# Patient Record
Sex: FEMALE | Race: BLACK OR AFRICAN AMERICAN | NOT HISPANIC OR LATINO | Employment: PART TIME | URBAN - METROPOLITAN AREA
[De-identification: names, ages, dates, MRNs, and addresses within clinical notes are randomized per-mention and may not be internally consistent; named-entity substitution may affect disease eponyms.]

---

## 2023-04-01 ENCOUNTER — HOSPITAL ENCOUNTER (EMERGENCY)
Facility: CLINIC | Age: 28
Discharge: HOME | End: 2023-04-02
Attending: EMERGENCY MEDICINE | Admitting: EMERGENCY MEDICINE
Payer: COMMERCIAL

## 2023-04-01 ENCOUNTER — APPOINTMENT (OUTPATIENT)
Dept: CT IMAGING | Facility: CLINIC | Age: 28
End: 2023-04-01
Attending: EMERGENCY MEDICINE
Payer: COMMERCIAL

## 2023-04-01 VITALS
RESPIRATION RATE: 16 BRPM | TEMPERATURE: 97.4 F | OXYGEN SATURATION: 100 % | WEIGHT: 115 LBS | SYSTOLIC BLOOD PRESSURE: 127 MMHG | HEART RATE: 75 BPM | DIASTOLIC BLOOD PRESSURE: 78 MMHG

## 2023-04-01 DIAGNOSIS — R10.31 ABDOMINAL PAIN, RIGHT LOWER QUADRANT: ICD-10-CM

## 2023-04-01 LAB
ALBUMIN SERPL BCG-MCNC: 4.4 G/DL (ref 3.5–5.2)
ALBUMIN UR-MCNC: NEGATIVE MG/DL
ALP SERPL-CCNC: 73 U/L (ref 35–104)
ALT SERPL W P-5'-P-CCNC: 9 U/L (ref 10–35)
ANION GAP SERPL CALCULATED.3IONS-SCNC: 10 MMOL/L (ref 7–15)
APPEARANCE UR: ABNORMAL
AST SERPL W P-5'-P-CCNC: 23 U/L (ref 10–35)
BASOPHILS # BLD AUTO: 0.1 10E3/UL (ref 0–0.2)
BASOPHILS NFR BLD AUTO: 1 %
BILIRUB SERPL-MCNC: 0.3 MG/DL
BILIRUB UR QL STRIP: NEGATIVE
BUN SERPL-MCNC: 3.9 MG/DL (ref 6–20)
CALCIUM SERPL-MCNC: 9.2 MG/DL (ref 8.6–10)
CHLORIDE SERPL-SCNC: 102 MMOL/L (ref 98–107)
COLOR UR AUTO: ABNORMAL
CREAT SERPL-MCNC: 0.54 MG/DL (ref 0.51–0.95)
DEPRECATED HCO3 PLAS-SCNC: 27 MMOL/L (ref 22–29)
EOSINOPHIL # BLD AUTO: 0.1 10E3/UL (ref 0–0.7)
EOSINOPHIL NFR BLD AUTO: 1 %
ERYTHROCYTE [DISTWIDTH] IN BLOOD BY AUTOMATED COUNT: 12.9 % (ref 10–15)
GFR SERPL CREATININE-BSD FRML MDRD: >90 ML/MIN/1.73M2
GLUCOSE SERPL-MCNC: 98 MG/DL (ref 70–99)
GLUCOSE UR STRIP-MCNC: NEGATIVE MG/DL
HCG UR QL: NEGATIVE
HCT VFR BLD AUTO: 37.7 % (ref 35–47)
HGB BLD-MCNC: 12.5 G/DL (ref 11.7–15.7)
HGB UR QL STRIP: NEGATIVE
HOLD SPECIMEN: NORMAL
IMM GRANULOCYTES # BLD: 0.1 10E3/UL
IMM GRANULOCYTES NFR BLD: 1 %
KETONES UR STRIP-MCNC: NEGATIVE MG/DL
LEUKOCYTE ESTERASE UR QL STRIP: NEGATIVE
LIPASE SERPL-CCNC: 13 U/L (ref 13–60)
LYMPHOCYTES # BLD AUTO: 2.5 10E3/UL (ref 0.8–5.3)
LYMPHOCYTES NFR BLD AUTO: 43 %
MCH RBC QN AUTO: 28.9 PG (ref 26.5–33)
MCHC RBC AUTO-ENTMCNC: 33.2 G/DL (ref 31.5–36.5)
MCV RBC AUTO: 87 FL (ref 78–100)
MONOCYTES # BLD AUTO: 0.6 10E3/UL (ref 0–1.3)
MONOCYTES NFR BLD AUTO: 10 %
NEUTROPHILS # BLD AUTO: 2.6 10E3/UL (ref 1.6–8.3)
NEUTROPHILS NFR BLD AUTO: 44 %
NITRATE UR QL: NEGATIVE
NRBC # BLD AUTO: 0 10E3/UL
NRBC BLD AUTO-RTO: 0 /100
PH UR STRIP: 6 [PH] (ref 5–7)
PLATELET # BLD AUTO: 296 10E3/UL (ref 150–450)
POTASSIUM SERPL-SCNC: 3.4 MMOL/L (ref 3.4–5.3)
PROT SERPL-MCNC: 7.4 G/DL (ref 6.4–8.3)
RBC # BLD AUTO: 4.32 10E6/UL (ref 3.8–5.2)
RBC URINE: <1 /HPF
SODIUM SERPL-SCNC: 139 MMOL/L (ref 136–145)
SP GR UR STRIP: 1 (ref 1–1.03)
SQUAMOUS EPITHELIAL: 3 /HPF
UROBILINOGEN UR STRIP-MCNC: NORMAL MG/DL
WBC # BLD AUTO: 5.8 10E3/UL (ref 4–11)
WBC URINE: 0 /HPF

## 2023-04-01 PROCEDURE — 85025 COMPLETE CBC W/AUTO DIFF WBC: CPT | Performed by: EMERGENCY MEDICINE

## 2023-04-01 PROCEDURE — 81001 URINALYSIS AUTO W/SCOPE: CPT | Performed by: EMERGENCY MEDICINE

## 2023-04-01 PROCEDURE — 99285 EMERGENCY DEPT VISIT HI MDM: CPT | Mod: 25

## 2023-04-01 PROCEDURE — 80053 COMPREHEN METABOLIC PANEL: CPT | Performed by: EMERGENCY MEDICINE

## 2023-04-01 PROCEDURE — 74177 CT ABD & PELVIS W/CONTRAST: CPT

## 2023-04-01 PROCEDURE — 81025 URINE PREGNANCY TEST: CPT | Performed by: EMERGENCY MEDICINE

## 2023-04-01 PROCEDURE — 96375 TX/PRO/DX INJ NEW DRUG ADDON: CPT

## 2023-04-01 PROCEDURE — 36415 COLL VENOUS BLD VENIPUNCTURE: CPT | Performed by: EMERGENCY MEDICINE

## 2023-04-01 PROCEDURE — 96374 THER/PROPH/DIAG INJ IV PUSH: CPT | Mod: 59

## 2023-04-01 PROCEDURE — 83690 ASSAY OF LIPASE: CPT | Performed by: EMERGENCY MEDICINE

## 2023-04-01 RX ORDER — MORPHINE SULFATE 4 MG/ML
4 INJECTION, SOLUTION INTRAMUSCULAR; INTRAVENOUS ONCE
Status: COMPLETED | OUTPATIENT
Start: 2023-04-01 | End: 2023-04-02

## 2023-04-01 RX ORDER — ONDANSETRON 2 MG/ML
4 INJECTION INTRAMUSCULAR; INTRAVENOUS ONCE
Status: COMPLETED | OUTPATIENT
Start: 2023-04-01 | End: 2023-04-02

## 2023-04-01 RX ORDER — IOPAMIDOL 755 MG/ML
58 INJECTION, SOLUTION INTRAVASCULAR ONCE
Status: COMPLETED | OUTPATIENT
Start: 2023-04-01 | End: 2023-04-02

## 2023-04-01 ASSESSMENT — ACTIVITIES OF DAILY LIVING (ADL): ADLS_ACUITY_SCORE: 33

## 2023-04-02 ENCOUNTER — APPOINTMENT (OUTPATIENT)
Dept: ULTRASOUND IMAGING | Facility: CLINIC | Age: 28
End: 2023-04-02
Attending: EMERGENCY MEDICINE
Payer: COMMERCIAL

## 2023-04-02 PROCEDURE — 250N000011 HC RX IP 250 OP 636: Performed by: EMERGENCY MEDICINE

## 2023-04-02 PROCEDURE — 76856 US EXAM PELVIC COMPLETE: CPT

## 2023-04-02 PROCEDURE — 250N000009 HC RX 250: Performed by: EMERGENCY MEDICINE

## 2023-04-02 RX ADMIN — ONDANSETRON 4 MG: 2 INJECTION INTRAMUSCULAR; INTRAVENOUS at 01:29

## 2023-04-02 RX ADMIN — SODIUM CHLORIDE 60 ML: 9 INJECTION, SOLUTION INTRAVENOUS at 00:09

## 2023-04-02 RX ADMIN — MORPHINE SULFATE 4 MG: 4 INJECTION, SOLUTION INTRAMUSCULAR; INTRAVENOUS at 01:28

## 2023-04-02 RX ADMIN — IOPAMIDOL 58 ML: 755 INJECTION, SOLUTION INTRAVENOUS at 00:09

## 2023-04-02 ASSESSMENT — ACTIVITIES OF DAILY LIVING (ADL)
ADLS_ACUITY_SCORE: 35
ADLS_ACUITY_SCORE: 35

## 2023-04-02 NOTE — ED PROVIDER NOTES
History     Chief Complaint:  abd pain    HPI   Franc Floyd is a 28 year old female, previously healthy, who presents with abdominal pain.  She has had constant 7 days of periumbilical and right upper quadrant pain.  There is no identified exacerbating or alleviating factors.  He has never had similar pain before.  She does have nausea but no vomiting.  There is no urinary symptoms or flank pain.  There is no fever.  Last menstrual period was 2 weeks ago and there is no abnormal vaginal discharge.    Independent Historian:   Parent - They report The above history    Review of External Notes: none     ROS:  Review of Systems    Allergies:  No Known Allergies     Medications:    No current outpatient medications on file.      Past Medical History:    No past medical history on file.    Past Surgical History:    No past surgical history on file.     Family History:    family history is not on file.    Social History:     PCP: No primary care provider on file.     Physical Exam     Patient Vitals for the past 24 hrs:   BP Temp Temp src Pulse Resp SpO2 Weight   04/01/23 2116 127/78 97.4  F (36.3  C) Temporal 75 16 100 % 52.2 kg (115 lb)        Physical Exam  General/Appearance: appears stated age, well-groomed, appears mildly uncomfortable  Eyes: EOMI, no scleral injection, no icterus  ENT: MMM  Neck: supple, nl ROM, no stiffness  Cardiovascular: RRR, nl S1S2, no m/r/g, 2+ pulses in all 4 extremities, cap refill <2sec  Respiratory: CTAB, good air movement throughout, no wheezes/rhonchi/rales, no increased WOB, no retractions  Back: no lesions, no CVA ttp  GI: abd soft, non-distended, RUQ and periumbilical ttp,  no HSM, no rebound, no guarding, nl BS  MSK: SOTO, good tone, no bony abnormality  Skin: warm and well-perfused, no rash, no edema, no ecchymosis, nl turgor  Neuro: GCS 15, alert and oriented, no gross focal neuro deficits  Heme: no petechia, no purpura, no active bleeding        Emergency Department Course    [unfilled]    Imaging:  CT Abdomen Pelvis w Contrast   Final Result   IMPRESSION:    1.  No acute inflammatory changes in the abdomen or pelvis. Formed stool material within normal caliber colon without mechanical obstruction or free gas. Postoperative changes in the right lower quadrant. Tiny hiatal hernia.      2.  Right ovarian corpus luteum. Small amount of dependent pelvic physiologic free fluid.      US Pelvis Cmplt w Transvag & Doppler LmtPel Duplex Limited    (Results Pending)      Report per radiology    Laboratory:  Labs Ordered and Resulted from Time of ED Arrival to Time of ED Departure   UA MACROSCOPIC WITH REFLEX TO MICRO AND CULTURE - Abnormal       Result Value    Color Urine Straw      Appearance Urine Slightly Cloudy (*)     Glucose Urine Negative      Bilirubin Urine Negative      Ketones Urine Negative      Specific Gravity Urine 1.002 (*)     Blood Urine Negative      pH Urine 6.0      Protein Albumin Urine Negative      Urobilinogen Urine Normal      Nitrite Urine Negative      Leukocyte Esterase Urine Negative      RBC Urine <1      WBC Urine 0      Squamous Epithelials Urine 3 (*)    COMPREHENSIVE METABOLIC PANEL - Abnormal    Sodium 139      Potassium 3.4      Chloride 102      Carbon Dioxide (CO2) 27      Anion Gap 10      Urea Nitrogen 3.9 (*)     Creatinine 0.54      Calcium 9.2      Glucose 98      Alkaline Phosphatase 73      AST 23      ALT 9 (*)     Protein Total 7.4      Albumin 4.4      Bilirubin Total 0.3      GFR Estimate >90     HCG QUALITATIVE URINE - Normal    hCG Urine Qualitative Negative     LIPASE - Normal    Lipase 13     CBC WITH PLATELETS AND DIFFERENTIAL    WBC Count 5.8      RBC Count 4.32      Hemoglobin 12.5      Hematocrit 37.7      MCV 87      MCH 28.9      MCHC 33.2      RDW 12.9      Platelet Count 296      % Neutrophils 44      % Lymphocytes 43      % Monocytes 10      % Eosinophils 1      % Basophils 1      % Immature Granulocytes 1      NRBCs per 100 WBC 0       Absolute Neutrophils 2.6      Absolute Lymphocytes 2.5      Absolute Monocytes 0.6      Absolute Eosinophils 0.1      Absolute Basophils 0.1      Absolute Immature Granulocytes 0.1      Absolute NRBCs 0.0          Emergency Department Course & Assessments:             Interventions:  Medications   ondansetron (ZOFRAN) injection 4 mg (has no administration in time range)   morphine (PF) injection 4 mg (has no administration in time range)   iopamidol (ISOVUE-370) solution 58 mL (58 mLs Intravenous $Given 4/2/23 0009)   sodium chloride 0.9 % CT scan flush use (60 mLs Intravenous $Given 4/2/23 0009)        Assessments:  2045 Evaluated pt    Independent Interpretation (X-rays, CTs, rhythm strip):  None    Consultations/Discussion of Management or Tests:  None        Social Determinants of Health affecting care:   None    Disposition:  Care of the patient was transferred to my colleague Dr. Palomares pending Pelvic US.     Impression & Plan      Medical Decision Making:  This patient is a pleasant 28-year-old female who presents with several days of constant right-sided abdominal pain.  Labs are relatively unremarkable and do not show a marked elevated white count and she is afebrile.  CT is negative for any obvious infection such as hepatitis, biliary infection and, appendicitis.  LFTs are unremarkable.  Pregnancy test is negative so I am not concerned about ectopic however I am concerned, with a CT showing a right pelvic cyst with some free fluid, for possible torsion so pelvic ultrasound has been ordered.  Her care is being handed off to Dr. Palomares to follow-up on the ultrasound and ultimate disposition.    Diagnosis:    ICD-10-CM    1. Abdominal pain, right lower quadrant  R10.31             Demetria Howard MD  04/02/23 0114

## 2023-04-02 NOTE — ED TRIAGE NOTES
Triage Assessment     Row Name 04/01/23 2115       Triage Assessment (Adult)    Airway WDL WDL       Respiratory WDL    Respiratory WDL WDL       Skin Circulation/Temperature WDL    Skin Circulation/Temperature WDL WDL       Cardiac WDL    Cardiac WDL WDL       Peripheral/Neurovascular WDL    Peripheral Neurovascular WDL WDL       Cognitive/Neuro/Behavioral WDL    Cognitive/Neuro/Behavioral WDL WDL            Pt. Having abd. Pain today=periumbilical for the last 7 days. No vomiting, denies back pain.

## 2023-04-02 NOTE — DISCHARGE INSTRUCTIONS
Discharge Instructions  Ovarian Cyst    Abdominal (belly) pain can be caused by many things. Your provider today has found that you have a cyst on the ovary. Women in their reproductive years form cysts every month, but only cause pain if they are very large, or if they rupture and release blood or fluid. Fortunately, they rarely require surgery or hospitalization. The pain from a ruptured cyst usually gets gradually better, and should be much better within a few days. If there is a large cyst, it will usually go away within 1-2 months, but needs to be watched to be sure it does go away, since sometimes a large cyst can become a cancer. There can be complications of a cyst, or other problems that cannot be found right away, so it is very important that you follow up as directed.      Generally, every Emergency Department visit should have a follow-up clinic visit with either a primary or a specialty clinic/provider. Please follow-up as instructed by your emergency provider today.    Return to the Emergency Department right away if:  Your pain becomes much worse or constant  You get an oral temperature above 100.4 F or as directed by your provider.  You have frequent vomiting  You faint, or feel very weak.  You have new symptoms or anything that worries you.    What can I do to help myself?  Take any medication prescribed by your provider.  You may use Tylenol  (acetaminophen) or Advil , Motrin  (ibuprofen) for pain. Be sure to read and follow the package directions, and ask your provider if you have questions.  Avoid sex for several days, because it will probably be painful.    If you were given a prescription for medicine here today, be sure to read all of the information (including the package insert) that comes with your prescription.  This will include important information about the medicine, its side effects, and any warnings that you need to know about.  The pharmacist who fills the prescription can provide  more information and answer questions you may have about the medicine.  If you have questions or concerns that the pharmacist cannot address, please call or return to the Emergency Department.     Remember that you can always come back to the Emergency Department if you are not able to see your regular provider in the amount of time listed above, if you get any new symptoms, or if there is anything that worries you.

## 2023-04-02 NOTE — ED PROVIDER NOTES
I assumed care of the patient from Dr. Howard pending an ultrasound.  This was undertaken and is largely unremarkable with no signs of torsion as noted below.  Following this patient was feeling improved however was still concerned about her abdominal pain, I have advised Tylenol and ibuprofen and follow-up with her primary care provider return here if new symptoms develop.  Patient was in agreement this plan of action.    US Pelvis Cmpl wo Transvaginal w Abd/Pel Duplex Lmt   Final Result   IMPRESSION:     1.  Trace free fluid seen in the posterior cul-de-sac, favored to be physiologic in nature, otherwise sonographically unremarkable ultrasound of the uterus and ovaries               CT Abdomen Pelvis w Contrast   Final Result   IMPRESSION:    1.  No acute inflammatory changes in the abdomen or pelvis. Formed stool material within normal caliber colon without mechanical obstruction or free gas. Postoperative changes in the right lower quadrant. Tiny hiatal hernia.      2.  Right ovarian corpus luteum. Small amount of dependent pelvic physiologic free fluid.           Trigger, Juvencio Finney MD  04/02/23 7279

## 2024-02-22 ENCOUNTER — OFFICE VISIT (OUTPATIENT)
Dept: URGENT CARE | Facility: URGENT CARE | Age: 29
End: 2024-02-22

## 2024-02-22 ENCOUNTER — ANCILLARY PROCEDURE (OUTPATIENT)
Dept: GENERAL RADIOLOGY | Facility: CLINIC | Age: 29
End: 2024-02-22
Attending: NURSE PRACTITIONER

## 2024-02-22 VITALS
OXYGEN SATURATION: 100 % | DIASTOLIC BLOOD PRESSURE: 82 MMHG | HEART RATE: 95 BPM | WEIGHT: 111 LBS | SYSTOLIC BLOOD PRESSURE: 121 MMHG | TEMPERATURE: 98.5 F | RESPIRATION RATE: 16 BRPM

## 2024-02-22 DIAGNOSIS — J02.9 SORE THROAT: ICD-10-CM

## 2024-02-22 DIAGNOSIS — R10.13 EPIGASTRIC PAIN: ICD-10-CM

## 2024-02-22 DIAGNOSIS — R10.11 RUQ ABDOMINAL PAIN: ICD-10-CM

## 2024-02-22 DIAGNOSIS — M79.10 MYALGIA: ICD-10-CM

## 2024-02-22 DIAGNOSIS — B34.9 VIRAL ILLNESS: Primary | ICD-10-CM

## 2024-02-22 DIAGNOSIS — R11.0 NAUSEA: ICD-10-CM

## 2024-02-22 DIAGNOSIS — R19.7 DIARRHEA, UNSPECIFIED TYPE: ICD-10-CM

## 2024-02-22 LAB
ALBUMIN SERPL-MCNC: 3.2 G/DL (ref 3.4–5)
ALBUMIN UR-MCNC: NEGATIVE MG/DL
ALP SERPL-CCNC: 61 U/L (ref 40–150)
ALT SERPL W P-5'-P-CCNC: 12 U/L (ref 0–50)
ANION GAP SERPL CALCULATED.3IONS-SCNC: <1 MMOL/L (ref 3–14)
APPEARANCE UR: CLEAR
AST SERPL W P-5'-P-CCNC: 21 U/L (ref 0–45)
BACTERIA #/AREA URNS HPF: ABNORMAL /HPF
BASOPHILS # BLD AUTO: 0 10E3/UL (ref 0–0.2)
BASOPHILS NFR BLD AUTO: 0 %
BILIRUB SERPL-MCNC: 0.7 MG/DL (ref 0.2–1.3)
BILIRUB UR QL STRIP: NEGATIVE
BUN SERPL-MCNC: 8 MG/DL (ref 7–30)
CALCIUM SERPL-MCNC: 9.2 MG/DL (ref 8.5–10.1)
CHLORIDE BLD-SCNC: 108 MMOL/L (ref 94–109)
CO2 SERPL-SCNC: 30 MMOL/L (ref 20–32)
COLOR UR AUTO: YELLOW
CREAT SERPL-MCNC: 0.4 MG/DL (ref 0.52–1.04)
DEPRECATED S PYO AG THROAT QL EIA: NEGATIVE
EGFRCR SERPLBLD CKD-EPI 2021: >90 ML/MIN/1.73M2
EOSINOPHIL # BLD AUTO: 0.1 10E3/UL (ref 0–0.7)
EOSINOPHIL NFR BLD AUTO: 1 %
ERYTHROCYTE [DISTWIDTH] IN BLOOD BY AUTOMATED COUNT: 13.1 % (ref 10–15)
ERYTHROCYTE [SEDIMENTATION RATE] IN BLOOD BY WESTERGREN METHOD: 13 MM/HR (ref 0–20)
FLUAV AG SPEC QL IA: NEGATIVE
FLUBV AG SPEC QL IA: NEGATIVE
GLUCOSE BLD-MCNC: 94 MG/DL (ref 70–99)
GLUCOSE UR STRIP-MCNC: NEGATIVE MG/DL
HCG UR QL: NEGATIVE
HCT VFR BLD AUTO: 37.8 % (ref 35–47)
HGB BLD-MCNC: 12.2 G/DL (ref 11.7–15.7)
HGB UR QL STRIP: ABNORMAL
IMM GRANULOCYTES # BLD: 0 10E3/UL
IMM GRANULOCYTES NFR BLD: 0 %
KETONES UR STRIP-MCNC: NEGATIVE MG/DL
LEUKOCYTE ESTERASE UR QL STRIP: NEGATIVE
LYMPHOCYTES # BLD AUTO: 0.8 10E3/UL (ref 0.8–5.3)
LYMPHOCYTES NFR BLD AUTO: 15 %
MCH RBC QN AUTO: 27.5 PG (ref 26.5–33)
MCHC RBC AUTO-ENTMCNC: 32.3 G/DL (ref 31.5–36.5)
MCV RBC AUTO: 85 FL (ref 78–100)
MONOCYTES # BLD AUTO: 0.3 10E3/UL (ref 0–1.3)
MONOCYTES NFR BLD AUTO: 6 %
NEUTROPHILS # BLD AUTO: 4.3 10E3/UL (ref 1.6–8.3)
NEUTROPHILS NFR BLD AUTO: 78 %
NITRATE UR QL: NEGATIVE
PH UR STRIP: 6 [PH] (ref 5–7)
PLATELET # BLD AUTO: 273 10E3/UL (ref 150–450)
POTASSIUM BLD-SCNC: 4.5 MMOL/L (ref 3.4–5.3)
PROT SERPL-MCNC: 7.3 G/DL (ref 6.8–8.8)
RBC # BLD AUTO: 4.44 10E6/UL (ref 3.8–5.2)
RBC #/AREA URNS AUTO: ABNORMAL /HPF
SODIUM SERPL-SCNC: 137 MMOL/L (ref 135–145)
SP GR UR STRIP: >=1.03 (ref 1–1.03)
SQUAMOUS #/AREA URNS AUTO: ABNORMAL /LPF
UROBILINOGEN UR STRIP-ACNC: 0.2 E.U./DL
WBC # BLD AUTO: 5.5 10E3/UL (ref 4–11)
WBC #/AREA URNS AUTO: ABNORMAL /HPF

## 2024-02-22 PROCEDURE — 87635 SARS-COV-2 COVID-19 AMP PRB: CPT | Performed by: NURSE PRACTITIONER

## 2024-02-22 PROCEDURE — 87651 STREP A DNA AMP PROBE: CPT | Performed by: NURSE PRACTITIONER

## 2024-02-22 PROCEDURE — 81025 URINE PREGNANCY TEST: CPT | Performed by: NURSE PRACTITIONER

## 2024-02-22 PROCEDURE — 74019 RADEX ABDOMEN 2 VIEWS: CPT | Mod: TC | Performed by: RADIOLOGY

## 2024-02-22 PROCEDURE — 87804 INFLUENZA ASSAY W/OPTIC: CPT | Performed by: NURSE PRACTITIONER

## 2024-02-22 PROCEDURE — 81001 URINALYSIS AUTO W/SCOPE: CPT | Performed by: NURSE PRACTITIONER

## 2024-02-22 PROCEDURE — 36415 COLL VENOUS BLD VENIPUNCTURE: CPT | Performed by: NURSE PRACTITIONER

## 2024-02-22 PROCEDURE — 85025 COMPLETE CBC W/AUTO DIFF WBC: CPT | Performed by: NURSE PRACTITIONER

## 2024-02-22 PROCEDURE — 80053 COMPREHEN METABOLIC PANEL: CPT | Performed by: NURSE PRACTITIONER

## 2024-02-22 PROCEDURE — 85652 RBC SED RATE AUTOMATED: CPT | Performed by: NURSE PRACTITIONER

## 2024-02-22 PROCEDURE — 99204 OFFICE O/P NEW MOD 45 MIN: CPT | Performed by: NURSE PRACTITIONER

## 2024-02-22 RX ORDER — ACETAMINOPHEN 500 MG
1000 TABLET ORAL EVERY 6 HOURS PRN
Qty: 60 TABLET | Refills: 0 | Status: SHIPPED | OUTPATIENT
Start: 2024-02-22 | End: 2024-03-03

## 2024-02-22 RX ORDER — ONDANSETRON 4 MG/1
4 TABLET, ORALLY DISINTEGRATING ORAL EVERY 8 HOURS PRN
Qty: 21 TABLET | Refills: 0 | Status: SHIPPED | OUTPATIENT
Start: 2024-02-22 | End: 2024-02-29

## 2024-02-22 RX ORDER — ONDANSETRON 4 MG/1
4 TABLET, ORALLY DISINTEGRATING ORAL ONCE
Status: COMPLETED | OUTPATIENT
Start: 2024-02-22 | End: 2024-02-22

## 2024-02-22 RX ADMIN — ONDANSETRON 4 MG: 4 TABLET, ORALLY DISINTEGRATING ORAL at 19:34

## 2024-02-23 LAB — GROUP A STREP BY PCR: NOT DETECTED

## 2024-02-23 NOTE — PATIENT INSTRUCTIONS
Patient Education     If pain worsens please go to the emergency room immediately for further assessment and treatment.    Acetaminophen 1000mg every 6 hours as needed for pain.    Diet for Vomiting and Diarrhea (Child)  Vomiting and diarrhea are common in children. A child can quickly lose too much fluid and become dehydrated. This is the loss of too much water and minerals from the body. This can be serious and even life-threatening. When this occurs, body fluids must be replaced. This is done by giving small amounts of liquids often.  If your child shows signs of dehydration, the doctor may tell you to use an oral rehydration solution. Oral rehydration solution can replace lost minerals called electrolytes. Oral rehydration solution can be used in addition to breast or bottle feedings. Oral rehydration solution may also reduce vomiting and diarrhea. You can buy oral rehydration solution at grocery stores and drug stores without a prescription.   In cases of severe dehydration or vomiting, a child may need to go to a hospital to have intravenous (IV) fluids.  Giving liquids and food  If using oral rehydration solution:  Follow your doctor s instructions when giving the solution to your child.  Use only prepared, purchased oral rehydration solution made for this purpose. Don't make your own solution. This is very important because the homemade solutions and sports drinks may not contain the amounts or ingredients necessary to stop dehydration.  If vomiting or diarrhea gets better after 2 to 3 hours, you can stop oral rehydration solution. You can then restart other clear liquids.  For solid foods:  Follow the diet your doctor advises.  If desired and tolerated, your child may eat regular food.  If your child is an infant and you are breastfeeding, continue to do so unless your healthcare provider directs you stop. If you are feeding formula to your infant, you may try a special oral rehydration solution in small  amounts frequently for a few hours. When the vomiting improves, you may restart the formula.  If unable to eat regular food, your child can drink clear liquids such as water, or suck on ice cubes. Do not give high-sugar fluids such as juice or soda.  If clear liquids are tolerated, slowly increase the amount. Alternate these fluids with oral rehydration solution as your doctor advises.  Your child can start a regular diet 12 to 24 hours after diarrhea or vomiting has stopped. Continue to give plenty of clear liquids.  You can resume your child's normal diet over time as he or she feels better. Don t force your child to eat, especially if he or she is having stomach pain or cramping. Don t feed your child large amounts at a time, even if he or she is hungry. This can make your child feel worse. You can give your child more food over time if he or she can tolerate it. Foods you can give include cereal, mashed potatoes, applesauce, mashed bananas, crackers, dry toast, rice, oatmeal, bread, noodles, pretzels, soups with rice or noodles, and cooked vegetables. As your child improves, you may try lean meats and yogurt.  If the symptoms come back, go back to a simple diet or clear liquids.  Follow-up care  Follow up with your child s healthcare provider, or as advised. If a stool sample was taken or cultures were done, call the healthcare provider for the results as instructed.  Call 911  Call 911 if your child has any of these symptoms:  Trouble breathing  Confusion  Extreme drowsiness or trouble walking  Loss of consciousness  Rapid heart rate  Stiff neck  Seizure  When to seek medical advice  Call your child s healthcare provider right away if any of these occur:  Abdominal pain that gets worse  Constant lower right abdominal pain  Repeated vomiting after the first 2 hours on liquids  Occasional vomiting for more than 24 hours  More than 8 diarrhea stools within 8 hours  Continued severe diarrhea for more than 24  hours  Blood in vomit or stool  Reduced oral intake  Dark urine or no urine for 4 to 6 hours in infants and young children, or 6 for 8 hours in older children, no tears when crying, sunken eyes, or dry mouth  Fussiness or crying that cannot be soothed  Unusual drowsiness  New rash  Diarrhea lasts more than 1 week on antibiotics  A child 2 years or older has a fever for more than 3 days  A child of any age has repeated fevers above 104 F (40 C)  Diana last reviewed this educational content on 2/1/2018 2000-2022 [employer]. Todos los derechos reservados. Esta información no pretende sustituir la atención médica profesional. Sólo schneider médico puede diagnosticar y tratar un problema de hailey.

## 2024-02-23 NOTE — PROGRESS NOTES
ICD-10-CM    1. Viral illness  B34.9       2. Nausea  R11.0 CBC with platelets and differential     Influenza A & B Antigen - Clinic Collect     Symptomatic COVID-19 Virus (Coronavirus) by PCR     ondansetron (ZOFRAN ODT) ODT tab 4 mg     Comprehensive metabolic panel (BMP + Alb, Alk Phos, ALT, AST, Total. Bili, TP)     Symptomatic COVID-19 Virus (Coronavirus) by PCR Nose     CBC with platelets and differential     ondansetron (ZOFRAN ODT) 4 MG ODT tab      3. Diarrhea, unspecified type  R19.7 CBC with platelets and differential     Influenza A & B Antigen - Clinic Collect     Symptomatic COVID-19 Virus (Coronavirus) by PCR     Comprehensive metabolic panel (BMP + Alb, Alk Phos, ALT, AST, Total. Bili, TP)     Symptomatic COVID-19 Virus (Coronavirus) by PCR Nose     CBC with platelets and differential      4. Myalgia  M79.10 Influenza A & B Antigen - Clinic Collect     Symptomatic COVID-19 Virus (Coronavirus) by PCR     ESR: Erythrocyte sedimentation rate     Symptomatic COVID-19 Virus (Coronavirus) by PCR Nose     ESR: Erythrocyte sedimentation rate     acetaminophen (TYLENOL) 500 MG tablet      5. RUQ abdominal pain  R10.11 XR Abdomen 2 Views     UA Macroscopic with reflex to Microscopic and Culture - Clinic Collect     CBC with platelets and differential     HCG Qual, Urine ([id number])     ESR: Erythrocyte sedimentation rate     Comprehensive metabolic panel (BMP + Alb, Alk Phos, ALT, AST, Total. Bili, TP)     HCG Qual, Urine ([id number])     CBC with platelets and differential     ESR: Erythrocyte sedimentation rate     UA Microscopic with Reflex to Culture      6. Epigastric pain  R10.13 XR Abdomen 2 Views     UA Macroscopic with reflex to Microscopic and Culture - Clinic Collect     CBC with platelets and differential     HCG Qual, Urine ([id number])     Comprehensive metabolic panel (BMP + Alb, Alk Phos, ALT, AST, Total. Bili, TP)     HCG Qual, Urine ([id number])     CBC with platelets and differential     UA  Microscopic with Reflex to Culture      7. Sore throat  J02.9 Streptococcus A Rapid Screen w/Reflex to PCR - Clinic Collect     Symptomatic COVID-19 Virus (Coronavirus) by PCR     Symptomatic COVID-19 Virus (Coronavirus) by PCR Nose     Group A Streptococcus PCR Throat Swab      Advised patient to take ondansetron and work on rehydrating herself.  Went over proper diet for nausea vomiting and diarrhea.  No signs of inflammation and sedimentation rate.  She will take acetaminophen as needed for headache aches and pains.  Believe this to be a viral illness that will pass in time.  If she develops any worsening abdominal pain or new fevers of 101 or over she will go to the emergency room.    Recheck in 10 days if symptoms have not improved, sooner if they worsen.  Decongestant as needed.    Ninite  used for entire visit.    Red flag warning signs and when to go to the emergency room discussed.  Reviewed potential adverse reactions to medications.    X-ray of abdomen shows normal gas pattern, no obstructions or free air.    Labs:  Results for orders placed or performed in visit on 02/22/24 (from the past 24 hour(s))   UA Macroscopic with reflex to Microscopic and Culture - Clinic Collect    Specimen: Urine, Clean Catch   Result Value Ref Range    Color Urine Yellow Colorless, Straw, Light Yellow, Yellow    Appearance Urine Clear Clear    Glucose Urine Negative Negative mg/dL    Bilirubin Urine Negative Negative    Ketones Urine Negative Negative mg/dL    Specific Gravity Urine >=1.030 1.003 - 1.035    Blood Urine Trace (A) Negative    pH Urine 6.0 5.0 - 7.0    Protein Albumin Urine Negative Negative mg/dL    Urobilinogen Urine 0.2 0.2, 1.0 E.U./dL    Nitrite Urine Negative Negative    Leukocyte Esterase Urine Negative Negative   Influenza A & B Antigen - Clinic Collect    Specimen: Nose; Swab   Result Value Ref Range    Influenza A antigen Negative Negative    Influenza B antigen Negative Negative     Narrative    Test results must be correlated with clinical data. If necessary, results should be confirmed by a molecular assay or viral culture.   Streptococcus A Rapid Screen w/Reflex to PCR - Clinic Collect    Specimen: Throat; Swab   Result Value Ref Range    Group A Strep antigen Negative Negative   UA Microscopic with Reflex to Culture   Result Value Ref Range    Bacteria Urine None Seen None Seen /HPF    RBC Urine 2-5 (A) 0-2 /HPF /HPF    WBC Urine None Seen 0-5 /HPF /HPF    Squamous Epithelials Urine Few (A) None Seen /LPF    Narrative    Urine Culture not indicated   HCG Qual, Urine ([id number])   Result Value Ref Range    hCG Urine Qualitative Negative Negative   Comprehensive metabolic panel (BMP + Alb, Alk Phos, ALT, AST, Total. Bili, TP)   Result Value Ref Range    Sodium 137 135 - 145 mmol/L    Potassium 4.5 3.4 - 5.3 mmol/L    Chloride 108 94 - 109 mmol/L    Carbon Dioxide (CO2) 30 20 - 32 mmol/L    Anion Gap <1 (L) 3 - 14 mmol/L    Urea Nitrogen 8 7 - 30 mg/dL    Creatinine 0.40 (L) 0.52 - 1.04 mg/dL    GFR Estimate >90 >60 mL/min/1.73m2    Calcium 9.2 8.5 - 10.1 mg/dL    Glucose 94 70 - 99 mg/dL    Alkaline Phosphatase 61 40 - 150 U/L    AST 21 0 - 45 U/L    ALT 12 0 - 50 U/L    Protein Total 7.3 6.8 - 8.8 g/dL    Albumin 3.2 (L) 3.4 - 5.0 g/dL    Bilirubin Total 0.7 0.2 - 1.3 mg/dL   CBC with platelets and differential    Narrative    The following orders were created for panel order CBC with platelets and differential.  Procedure                               Abnormality         Status                     ---------                               -----------         ------                     CBC with platelets and d...[[id number]]                      Final result                 Please view results for these tests on the individual orders.   ESR: Erythrocyte sedimentation rate   Result Value Ref Range    Erythrocyte Sedimentation Rate 13 0 - 20 mm/hr   CBC with platelets and differential   Result  Value Ref Range    WBC Count 5.5 4.0 - 11.0 10e3/uL    RBC Count 4.44 3.80 - 5.20 10e6/uL    Hemoglobin 12.2 11.7 - 15.7 g/dL    Hematocrit 37.8 35.0 - 47.0 %    MCV 85 78 - 100 fL    MCH 27.5 26.5 - 33.0 pg    MCHC 32.3 31.5 - 36.5 g/dL    RDW 13.1 10.0 - 15.0 %    Platelet Count 273 150 - 450 10e3/uL    % Neutrophils 78 %    % Lymphocytes 15 %    % Monocytes 6 %    % Eosinophils 1 %    % Basophils 0 %    % Immature Granulocytes 0 %    Absolute Neutrophils 4.3 1.6 - 8.3 10e3/uL    Absolute Lymphocytes 0.8 0.8 - 5.3 10e3/uL    Absolute Monocytes 0.3 0.0 - 1.3 10e3/uL    Absolute Eosinophils 0.1 0.0 - 0.7 10e3/uL    Absolute Basophils 0.0 0.0 - 0.2 10e3/uL    Absolute Immature Granulocytes 0.0 <=0.4 10e3/uL       SUBJECTIVE:   Franc Floyd is a 29 year old female presenting with a chief complaint of   Chief Complaint   Patient presents with    Diarrhea     Yesterday, nausea, stomach ache, headache, back pain body aches   .    Review of systems is negative except for as noted in the HPI.    OBJECTIVE  /82   Pulse 95   Temp 98.5  F (36.9  C)   Resp 16   Wt 50.3 kg (111 lb)   SpO2 100%       GENERAL: Alert, mild distress  SKIN: skin is clear, no rash or abnormal pigmentation  HEAD: The head is normocephalic.   EYES: The eyes are normal. The conjunctivae and cornea normal.   NECK: The neck is supple and thyroid is normal, no masses; LYMPH NODES: No adenopathy  HENT: Bilateral tympanic membranes and canals appear normal, mild erythema of pharynx, nasal passages are clear  LUNGS: The lung fields are clear to auscultation, no rales, rhonchi, wheezing or retractions  ABD: Tenderness in the right upper quadrant and epigastric region with palpation, normal bowel sounds  CV: Rhythm is regular. S1 and S2 are normal. No murmurs.  EXTREMITIES: Symmetric extremities no deformities    ALEXIS Currie, CNP  [facility] Provider    The use of Dragon/Power[employer] dictation services may have been used to construct  the content in this note; any grammatical or spelling errors are non-intentional. Please contact the author of this note directly if you are in need of any clarification.

## 2024-02-24 LAB — SARS-COV-2 RNA RESP QL NAA+PROBE: NEGATIVE

## 2024-12-21 ENCOUNTER — HOSPITAL ENCOUNTER (EMERGENCY)
Facility: CLINIC | Age: 29
Discharge: HOME | End: 2024-12-21
Attending: EMERGENCY MEDICINE | Admitting: EMERGENCY MEDICINE

## 2024-12-21 VITALS
RESPIRATION RATE: 16 BRPM | OXYGEN SATURATION: 100 % | SYSTOLIC BLOOD PRESSURE: 120 MMHG | HEART RATE: 86 BPM | TEMPERATURE: 96.8 F | DIASTOLIC BLOOD PRESSURE: 77 MMHG

## 2024-12-21 DIAGNOSIS — N64.4 BREAST PAIN, LEFT: ICD-10-CM

## 2024-12-21 PROCEDURE — 99283 EMERGENCY DEPT VISIT LOW MDM: CPT

## 2024-12-21 ASSESSMENT — ACTIVITIES OF DAILY LIVING (ADL)
ADLS_ACUITY_SCORE: 41

## 2024-12-21 NOTE — LETTER
December 21, 2024      To Whom It May Concern:      Franc Floyd was seen in our Emergency Department today, 12/21/24.  I expect her condition to improve over the next 1 days.  She may return to work when improved.    Sincerely,        Israel Rizo RN

## 2024-12-21 NOTE — ED TRIAGE NOTES
Pt reports pt reports pain in the left breast for last 3 weeks. Pt also reports some pain in right breast.      Denies giving birth recently any redness or discharge from breasts or any chest pain or abd pain     Reports some fevers.      Due to language barrier, an  was present during the history-taking and subsequent discussion (and for part of the physical exam) with this patient.

## 2024-12-22 NOTE — DISCHARGE INSTRUCTIONS
You can use tylenol and ibuprofen for pain.  I do think you will need an outpatient breast ultrasound.  You can call [facility] to schedule an appointment. Their number is [phone].  You can also follow up with your primary care provider to schedule an ultrasound.    Please return if your breast becomes red, you have fevers, or rash.

## 2024-12-22 NOTE — ED PROVIDER NOTES
Emergency Department Note      History of Present Illness     Chief Complaint   Breast Pain      HPI   Franc Floyd is a 29 year old female presenting to the emergency department with a complaint of left breast pain for the past 3 weeks.  Patient is not currently breast-feeding.  She has not had any fevers.  She has not noticed any redness or masses in her breast.  No nipple discharge or irregularity of the skin.    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  Good air movement  CV: Normal rate and rhythm, no murmurs  GI:  Abdomen soft and non-distended.  No tenderness, guarding or rebound  Skin: Warm, dry.  No rashes or petechiae  Musculoskeletal: No peripheral edema or calf tenderness  Neuro: Alert and oriented to person/place/time  Breast exam: Left breast is mildly tender to palpation at the 6 o'clock position.  No irregularities of the areola.  No nipple discharge.  No masses palpated.  No redness.  No irregularities of the skin of the breast.    Diagnostics     Lab Results   Labs Ordered and Resulted from Time of ED Arrival to Time of ED Departure - No data to display    Imaging   No orders to display       Independent Interpretation   None    ED Course      Medications Administered   Medications - No data to display    Procedures   Procedures     Discussion of Management    None    ED Course   ED Course as of 12/21/24 2142   Sat Dec 21, 2024   1923 I obtained history and examined the patient as noted above         Additional Documentation  None    Medical Decision Making / Diagnosis     CMS Diagnoses: None    MIPS       None    MDM   Franc Floyd is a 29 year old female presents to the emergency department with a complaint of 3 weeks of left breast pain.  On exam patient does not have any signs of cellulitis, masses, abscess.  No discharge from the nipple.  She does have some tenderness to palpation at the 6 o'clock position.  Differential includes but is not limited to infection, cancer, trauma. I did advise her that she needs to follow-up with the breast center for an ultrasound.  I also placed a consult for a breast specialist.  I do not see any signs of cellulitis or abscess on my exam, I do not think that she needs any antibiotics started tonight.  She has not been taking any Tylenol or ibuprofen, I did advise her to start using these.  Patient is agreeable with the plan.  She is discharged home.    Disposition   The patient was discharged.     Diagnosis     ICD-10-CM    1. Breast pain, left  N64.4 Breast Provider  Referral     Primary Care Referral           Discharge Medications   There are no discharge medications for this patient.        Scribe Disclosure:  I, Eduardo Rasheedon, am serving as a scribe at 7:25 PM on 12/21/2024 to document services personally performed by Katalina Gonzales MD based on my observations and the provider's statements to me.        Katalina Gonzales MD  12/22/24 3800

## 2024-12-23 ENCOUNTER — PATIENT OUTREACH (OUTPATIENT)
Dept: ONCOLOGY | Facility: CLINIC | Age: 29
End: 2024-12-23

## 2024-12-23 NOTE — PROGRESS NOTES
New Patient Oncology Nurse Navigator Note     Referring provider: Katalina Gonzales MD      Referring Clinic/Organization: [facility] EMERGENCY DEPT     Referred to (specialty:) Breast Provider Referral      Date Referral Received: December 21, 2024     Evaluation for:  N64.4 (ICD-10-CM) - Breast pain, left     Clinical History (per Nurse review of records provided):      Franc Floyd is a 29 year old female who presented to emergency department on 12/21/24 with complaint of left breast pain present for 3 weeks. Provider documented on exam patient did not have any signs of cellulitis, masses, abscess. No discharge from the nipple. She did have some tenderness to palpation at the 6 o'clock position. Patient is not breastfeeding and provider did not find signs of cellulitis or abscess. Patient discharged to home and advised to use acetaminophen and ibuprofen for pain management.    No evidence of prior breast imaging.   No imaging orders available.    Describe the 1/10 at worst, pain, quality, intensity, duration etc.   Any lumps  Fever Chills  Nipple inversion  Skin changes  Nipple discharge  Family history of breast or ovarian cancer    SCHEDULE - DAMIR DAVISON at , 1/9 at 8:30am (okay to schedule on the half hour overbook), 60 min in person with imaging to follow. Please confirm with Tristan Mendiola that imaging will be available directly after the appt. This can confirmed through phone call to Tristan at [phone], Secure Chat, Staff Message or Teams.    12/24 0857 - Telephoned and left voice message for patient requesting call back. Generically explained my role and purpose for the call    12/26 1043 - 12/24 0857 - Telephoned and left voice message for patient requesting call back. Generically explained my role and purpose for the call.    Patient resides in [place], [place].    Records Location: See Bookmarked material     Records Needed: Breast imaging for past 5 years  (although no evidence any has occurred).     No coverage found.

## 2025-01-08 ENCOUNTER — OFFICE VISIT (OUTPATIENT)
Dept: INTERNAL MEDICINE | Facility: CLINIC | Age: 30
End: 2025-01-08
Attending: EMERGENCY MEDICINE

## 2025-01-08 DIAGNOSIS — Z53.9 NO SHOW: Primary | ICD-10-CM

## 2025-02-26 ENCOUNTER — HOSPITAL ENCOUNTER (EMERGENCY)
Facility: CLINIC | Age: 30
Discharge: HOME | End: 2025-02-26
Attending: EMERGENCY MEDICINE | Admitting: EMERGENCY MEDICINE

## 2025-02-26 VITALS
TEMPERATURE: 98.3 F | HEART RATE: 68 BPM | OXYGEN SATURATION: 100 % | RESPIRATION RATE: 18 BRPM | DIASTOLIC BLOOD PRESSURE: 74 MMHG | SYSTOLIC BLOOD PRESSURE: 111 MMHG

## 2025-02-26 DIAGNOSIS — L72.3 SEBACEOUS CYST: ICD-10-CM

## 2025-02-26 PROBLEM — G43.109 MIGRAINE AURA WITHOUT HEADACHE: Status: ACTIVE | Noted: 2025-02-26

## 2025-02-26 PROBLEM — K35.80 ACUTE APPENDICITIS, UNSPECIFIED ACUTE APPENDICITIS TYPE: Status: ACTIVE | Noted: 2021-08-06

## 2025-02-26 PROBLEM — R51.9 CHRONIC DAILY HEADACHE: Status: ACTIVE | Noted: 2025-02-26

## 2025-02-26 PROBLEM — F43.20 ADJUSTMENT REACTION: Status: ACTIVE | Noted: 2025-02-26

## 2025-02-26 PROCEDURE — 99283 EMERGENCY DEPT VISIT LOW MDM: CPT

## 2025-02-26 PROCEDURE — 10060 I&D ABSCESS SIMPLE/SINGLE: CPT

## 2025-02-26 RX ORDER — CEPHALEXIN 500 MG/1
500 CAPSULE ORAL 3 TIMES DAILY
Qty: 21 CAPSULE | Refills: 0 | Status: SHIPPED | OUTPATIENT
Start: 2025-02-26 | End: 2025-03-05

## 2025-02-26 RX ORDER — OMEPRAZOLE 20 MG/1
CAPSULE, DELAYED RELEASE ORAL
COMMUNITY
Start: 2024-04-15

## 2025-02-26 ASSESSMENT — ACTIVITIES OF DAILY LIVING (ADL)
ADLS_ACUITY_SCORE: 41

## 2025-02-26 NOTE — ED TRIAGE NOTES
Pt come into ED for wound on her forehead that has been getting worse for the last 2.5 weeks.

## 2025-02-26 NOTE — DISCHARGE INSTRUCTIONS
Do not pick or squeeze it.  Okay to shower.  Keep a Band-Aid on it for 2 to 3 days until it quits draining.  Schedule follow-up appointment in the next couple of weeks with your doctor in the clinic.  Recheck sooner if it gets worse.  This is a sebaceous cyst and in the next 3 or 4 months when it reforms, it will need to be removed in the clinic.

## 2025-02-26 NOTE — ED PROVIDER NOTES
Emergency Department Note      History of Present Illness     Chief Complaint   Wound Check      HPI   Franc Floyd is a 30 year old female presenting to the ED for wound check. She is accompanied in the ED by her sister who explains that over the past two weeks patient has formed a wound over her left forehead that has worsened since onset. She states it has been draining intermittently. The patient denies picking at the wound. Denies fevers.    Independent Historian   Sister as detailed above.    Review of External Notes   None     Past Medical History     Medical History and Problem List   Adjustment reaction  Acute appendicitis   Migraine    Medications   Prilosec    Surgical History   No past surgical history on file.    Physical Exam     Patient Vitals for the past 24 hrs:   BP Temp Temp src Pulse Resp SpO2   02/26/25 1031 111/74 98.3  F (36.8  C) Temporal 68 18 100 %     Physical Exam  Nursing note and vitals reviewed.  Constitutional:  Alert.  Appears comfortable.   HENT:    A swollen scabbed area just above the right eyebrow in the lower forehead.  Some surrounding erythema and it is tender and fluctuant  Skin:    Scabbed areas noted above above the eyebrow.  Maybe some slight erythema around it is very tender.  Sebum was removed and the I&D  Psychiatric:   Behavior is normal. Judgment and thought content normal.     Diagnostics     Lab Results   Labs Ordered and Resulted from Time of ED Arrival to Time of ED Departure - No data to display    Imaging   No orders to display     Independent Interpretation   None    ED Course      Medications Administered   Medications - No data to display    Procedures   Procedures     Incision and Drainage     Procedure: Incision and Drainage     Consent: Verbal    Indication: Inflamed sebaceous cyst     Location: Head    Size: 1.5 cm    Ultrasound Guidance: No    Preparation: Alcohol     Anesthesia/Sedation: Bupivacaine with Epinephrine - 0.25%     Procedure Detail:     Aspiration: No  Incision Type: Stab  Scalpel: 11  Lesion Management: Probed and deloculated - sebum leaked from the site  Wound Management: Left open   Packing: None     Patient Status: The patient tolerated the procedure well: Yes. There were no complications.      Discussion of Management   None    ED Course   ED Course as of 02/26/25 1504   Wed Feb 26, 2025   1243 I obtained history and examined the patient as noted above.   1449 I rechecked the patient and performed the incision and drainage procedure noted above.        Additional Documentation  None    Medical Decision Making / Diagnosis     CMS Diagnoses: None    MIPS       None    [facility]   Franc Flyod is a 30 year old female comes in with tender fluctuant swollen area on her right forehead.  It looks like she has been picking at it.  After I made a small incision all of the sebum was extracted and there was no pus.  There was some erythema around the area so it may have been a mild infection we will put her on Keflex.  I told her she will need to recheck in the clinic and then when this thing matures and reforms in the next 2-3 or 3 to 4 months she should set up an appointment to have it excised or it will just keep recurring.    Do not pick or squeeze it.  Okay to shower.  Keep a Band-Aid on it for 2 to 3 days until it quits draining.  Schedule follow-up appointment in the next couple of weeks with your doctor in the clinic.  Recheck sooner if it gets worse.  This is a sebaceous cyst and in the next 3 or 4 months when it reforms, it will need to be removed in the clinic.    Disposition   The patient was discharged.     Diagnosis     ICD-10-CM    1. Sebaceous cyst  L72.3     Inflamed forehead           Discharge Medications   Discharge Medication List as of 2/26/2025  2:58 PM        START taking these medications    Details   cephALEXin (KEFLEX) 500 MG capsule Take 1 capsule (500 mg) by mouth 3 times daily for 7 days., Disp-21 capsule, R-0, E-Prescribe            Scribe Disclosure:  I, ANDRES CARLI, am serving as a scribe at 12:47 PM on 2/26/2025 to document services personally performed by Ivania Mccann MD based on my observations and the provider's statements to me.      Ivania Mccann MD  02/26/25 1502

## 2025-05-20 ENCOUNTER — OFFICE VISIT (OUTPATIENT)
Dept: INTERNAL MEDICINE | Facility: CLINIC | Age: 30
End: 2025-05-20
Payer: COMMERCIAL

## 2025-05-20 ENCOUNTER — RESULTS FOLLOW-UP (OUTPATIENT)
Dept: INTERNAL MEDICINE | Facility: CLINIC | Age: 30
End: 2025-05-20

## 2025-05-20 VITALS
OXYGEN SATURATION: 100 % | BODY MASS INDEX: 20.24 KG/M2 | WEIGHT: 110 LBS | DIASTOLIC BLOOD PRESSURE: 72 MMHG | TEMPERATURE: 97.1 F | HEIGHT: 62 IN | RESPIRATION RATE: 16 BRPM | HEART RATE: 85 BPM | SYSTOLIC BLOOD PRESSURE: 105 MMHG

## 2025-05-20 DIAGNOSIS — N64.4 PAIN OF BOTH BREASTS: Primary | ICD-10-CM

## 2025-05-20 DIAGNOSIS — R52 BODY ACHES: ICD-10-CM

## 2025-05-20 DIAGNOSIS — R51.9 FREQUENT HEADACHES: ICD-10-CM

## 2025-05-20 DIAGNOSIS — K59.09 CHRONIC CONSTIPATION: ICD-10-CM

## 2025-05-20 LAB
ERYTHROCYTE [DISTWIDTH] IN BLOOD BY AUTOMATED COUNT: 13.9 % (ref 10–15)
ERYTHROCYTE [SEDIMENTATION RATE] IN BLOOD BY WESTERGREN METHOD: 18 MM/HR (ref 0–20)
FOLATE SERPL-MCNC: 12.7 NG/ML (ref 4.6–34.8)
HCT VFR BLD AUTO: 35.6 % (ref 35–47)
HGB BLD-MCNC: 11.6 G/DL (ref 11.7–15.7)
MCH RBC QN AUTO: 25.6 PG (ref 26.5–33)
MCHC RBC AUTO-ENTMCNC: 32.6 G/DL (ref 31.5–36.5)
MCV RBC AUTO: 78 FL (ref 78–100)
PLATELET # BLD AUTO: 318 10E3/UL (ref 150–450)
RBC # BLD AUTO: 4.54 10E6/UL (ref 3.8–5.2)
WBC # BLD AUTO: 5.5 10E3/UL (ref 4–11)

## 2025-05-20 PROCEDURE — 36415 COLL VENOUS BLD VENIPUNCTURE: CPT | Performed by: INTERNAL MEDICINE

## 2025-05-20 PROCEDURE — 82607 VITAMIN B-12: CPT | Performed by: INTERNAL MEDICINE

## 2025-05-20 PROCEDURE — 84443 ASSAY THYROID STIM HORMONE: CPT | Performed by: INTERNAL MEDICINE

## 2025-05-20 PROCEDURE — 80053 COMPREHEN METABOLIC PANEL: CPT | Performed by: INTERNAL MEDICINE

## 2025-05-20 PROCEDURE — 86140 C-REACTIVE PROTEIN: CPT | Performed by: INTERNAL MEDICINE

## 2025-05-20 PROCEDURE — 82728 ASSAY OF FERRITIN: CPT | Performed by: INTERNAL MEDICINE

## 2025-05-20 PROCEDURE — 83540 ASSAY OF IRON: CPT | Performed by: INTERNAL MEDICINE

## 2025-05-20 PROCEDURE — 3074F SYST BP LT 130 MM HG: CPT | Performed by: INTERNAL MEDICINE

## 2025-05-20 PROCEDURE — 82306 VITAMIN D 25 HYDROXY: CPT | Performed by: INTERNAL MEDICINE

## 2025-05-20 PROCEDURE — 85652 RBC SED RATE AUTOMATED: CPT | Performed by: INTERNAL MEDICINE

## 2025-05-20 PROCEDURE — 3078F DIAST BP <80 MM HG: CPT | Performed by: INTERNAL MEDICINE

## 2025-05-20 PROCEDURE — 99214 OFFICE O/P EST MOD 30 MIN: CPT | Performed by: INTERNAL MEDICINE

## 2025-05-20 PROCEDURE — 85027 COMPLETE CBC AUTOMATED: CPT | Performed by: INTERNAL MEDICINE

## 2025-05-20 PROCEDURE — 82746 ASSAY OF FOLIC ACID SERUM: CPT | Performed by: INTERNAL MEDICINE

## 2025-05-20 PROCEDURE — 83550 IRON BINDING TEST: CPT | Performed by: INTERNAL MEDICINE

## 2025-05-20 RX ORDER — POLYETHYLENE GLYCOL 3350 17 G/17G
1 POWDER, FOR SOLUTION ORAL DAILY
Qty: 850 G | Refills: 11 | Status: SHIPPED | OUTPATIENT
Start: 2025-05-20

## 2025-05-20 NOTE — PROGRESS NOTES
"  ASSESSMENT/PLAN                                                      (N64.4) Pain of both breasts  (primary encounter diagnosis)  Comment: diffuse bilateral breast pain ongoing for years; normal exam.  Plan: bilateral breast ultrasound ordered - patient to schedule.     (R52) Body aches  (R51.9) Frequent headaches  Plan: Labs today to evaluate for potential organic causes of/contributors to bodyaches and headaches; if labs are unrevealing, consider low-dose daily amitriptyline to address headaches (and possibly body aches as well).    (K59.09) Chronic constipation  Plan: TRIAL of Miralax daily and consistently; if symptoms worsen, change, or do not improve, patient to contact MD.      Bertha Strauss MD   [address] [address]  T: [phone], F: [phone]    SUBJECTIVE                                                      Franc Floyd is a very pleasant 30 year old female who presents breast pain:    Accompanied by family member.    [employer] phone  utilized.    Patient complains of diffuse (nonfocal) bilateral breast pain for many years.  The pain does not fluctuate with periods. No breast lumps, skin changes, or nipple discharge.    No family history of breast cancer.  Patient wears supportive and appropriately fitted undergarments during the day.    Unrelated to above, patient complains of chronic constipation, frequent headaches, and diffuse bodyaches that are worse in the morning. No joint redness or swelling.    OBJECTIVE                                                      /72   Pulse 85   Temp 97.1  F (36.2  C) (Temporal)   Resp 16   Ht 1.581 m (5' 2.25\")   Wt 49.9 kg (110 lb)   LMP 05/01/2025 (Approximate)   SpO2 100%   BMI 19.96 kg/m    Constitutional: well-appearing  Respiratory: normal respiratory effort; clear to auscultation bilaterally  Cardiovascular: regular rate and rhythm; no edema  Breasts: normal appearance; no masses or " skin retraction; no nipple discharge or bleeding; no axillary lymphadenopathy  Gastrointestinal: soft, non-tender, and non-distended; no organomegaly or masses  Musculoskeletal: normal gait and station  Psych: normal judgment and insight; normal mood and affect; recent and remote memory intact    ---    (Note documentation was completed, in part, with [employer] voice-recognition software. Documentation was reviewed, but some grammatical, spelling, and word errors may remain.)

## 2025-05-21 LAB
ALBUMIN SERPL BCG-MCNC: 4.4 G/DL (ref 3.5–5.2)
ALP SERPL-CCNC: 71 U/L (ref 40–150)
ALT SERPL W P-5'-P-CCNC: 14 U/L (ref 0–50)
ANION GAP SERPL CALCULATED.3IONS-SCNC: 12 MMOL/L (ref 7–15)
AST SERPL W P-5'-P-CCNC: 28 U/L (ref 0–45)
BILIRUB SERPL-MCNC: 0.3 MG/DL
BUN SERPL-MCNC: 9.4 MG/DL (ref 6–20)
CALCIUM SERPL-MCNC: 9.8 MG/DL (ref 8.8–10.4)
CHLORIDE SERPL-SCNC: 102 MMOL/L (ref 98–107)
CREAT SERPL-MCNC: 0.57 MG/DL (ref 0.51–0.95)
CRP SERPL-MCNC: <3 MG/L
EGFRCR SERPLBLD CKD-EPI 2021: >90 ML/MIN/1.73M2
FERRITIN SERPL-MCNC: 10 NG/ML (ref 6–175)
GLUCOSE SERPL-MCNC: 87 MG/DL (ref 70–99)
HCO3 SERPL-SCNC: 23 MMOL/L (ref 22–29)
IRON BINDING CAPACITY (ROCHE): 444 UG/DL (ref 240–430)
IRON SATN MFR SERPL: 10 % (ref 15–46)
IRON SERPL-MCNC: 46 UG/DL (ref 37–145)
POTASSIUM SERPL-SCNC: 4 MMOL/L (ref 3.4–5.3)
PROT SERPL-MCNC: 7.7 G/DL (ref 6.4–8.3)
SODIUM SERPL-SCNC: 137 MMOL/L (ref 135–145)
TSH SERPL DL<=0.005 MIU/L-ACNC: 4.19 UIU/ML (ref 0.3–4.2)
VIT B12 SERPL-MCNC: 444 PG/ML (ref 232–1245)
VIT D+METAB SERPL-MCNC: 23 NG/ML (ref 20–50)

## 2025-05-21 NOTE — TELEPHONE ENCOUNTER
Pt called the clinic stating someone called her from the clinic. Writer is unable to see who called pt. Relayed lab results from below about ESR, folate and CBC results. Pt stated no questions at this time. Writer did transfer pt to scheduling for mammo since referral was placed. Pt is appreciative.

## 2025-05-30 ENCOUNTER — TELEPHONE (OUTPATIENT)
Dept: INTERNAL MEDICINE | Facility: CLINIC | Age: 30
End: 2025-05-30
Payer: COMMERCIAL

## 2025-05-30 DIAGNOSIS — E61.1 IRON DEFICIENCY: Primary | ICD-10-CM

## 2025-05-30 NOTE — TELEPHONE ENCOUNTER
Prior Authorization Retail Medication Request    Medication/Dose: ferrous fumarate-vitamin C ER (MARIA DE JESUS-SEQUELS) 65-25 MG CR tablet  Diagnosis and ICD code (if different than what is on RX):  [D50.0]       Ins[facility]   [facility]  Ins[facility] ID:  [id number]       Pharmacy Information (if different than what is on RX)  [facility]  [id number] [place], [place] - [address] AT [facility] [address]   Phone:  [phone]  Fax: [phone]

## 2025-06-01 NOTE — TELEPHONE ENCOUNTER
PA Initiation    Medication: MARIA DE JESUS-SEQUELS 65-25 MG PO TBCR  Insurance Company: [employer] - Phone [phone] Fax [phone]  Pharmacy Filling the Rx: [id number] - [place], [place] - [facility] AT [facility] [address]  Filling Pharmacy Phone: [phone]  Filling Pharmacy Fax: [phone]  Start Date: 6/1/2025

## 2025-06-02 RX ORDER — IRON,CARBONYL/ASCORBIC ACID 65MG-125MG
1 TABLET ORAL DAILY
Qty: 90 TABLET | Refills: 3 | Status: SHIPPED | OUTPATIENT
Start: 2025-06-02

## 2025-06-02 NOTE — TELEPHONE ENCOUNTER
PRIOR AUTHORIZATION DENIED    Medication: MARIA DE JESUS-SEQUELS 65-25 MG PO TBCR    Insurance Company: [employer] - Phone [phone] Fax [phone]    Denial Date: 6/2/2025    Denial Reason(s): Patient needs to try and fail ALL preferred medications: FerraPlus 90 tabs, Ferrex tabs, iron-vitamin c 65-125tabs, ferrous fumarate/vitamin B12/vitamin c tabs, ferrous fumarate polysaccharide complex, vitron c  tabs, etc     Appeal Information:

## 2025-06-03 NOTE — TELEPHONE ENCOUNTER
Spoke to patient with [facility] interpretor. Patient verbalized understanding and has no questions at this time.